# Patient Record
Sex: FEMALE
[De-identification: names, ages, dates, MRNs, and addresses within clinical notes are randomized per-mention and may not be internally consistent; named-entity substitution may affect disease eponyms.]

---

## 2021-12-28 ENCOUNTER — APPOINTMENT (OUTPATIENT)
Dept: AFTER HOURS CARE | Facility: EMERGENCY ROOM | Age: 61
End: 2021-12-28

## 2021-12-28 ENCOUNTER — TRANSCRIPTION ENCOUNTER (OUTPATIENT)
Age: 61
End: 2021-12-28

## 2021-12-29 DIAGNOSIS — R30.0 DYSURIA: ICD-10-CM

## 2021-12-29 PROBLEM — Z00.00 ENCOUNTER FOR PREVENTIVE HEALTH EXAMINATION: Status: ACTIVE | Noted: 2021-12-29

## 2021-12-29 PROCEDURE — 99203 OFFICE O/P NEW LOW 30 MIN: CPT | Mod: 1L,95

## 2021-12-29 RX ORDER — FAMOTIDINE 20 MG/1
20 TABLET, FILM COATED ORAL DAILY
Qty: 14 | Refills: 0 | Status: ACTIVE | COMMUNITY
Start: 2021-12-29 | End: 1900-01-01

## 2021-12-29 RX ORDER — PHENAZOPYRIDINE 100 MG/1
100 TABLET, FILM COATED ORAL 3 TIMES DAILY
Qty: 6 | Refills: 0 | Status: ACTIVE | COMMUNITY
Start: 2021-12-29 | End: 1900-01-01

## 2021-12-29 RX ORDER — CEFPODOXIME PROXETIL 100 MG/1
100 TABLET, FILM COATED ORAL
Qty: 14 | Refills: 0 | Status: ACTIVE | COMMUNITY
Start: 2021-12-29 | End: 1900-01-01

## 2021-12-29 NOTE — HISTORY OF PRESENT ILLNESS
[Other Location: e.g. School (Enter Location, City,State)___] : at [unfilled], at the time of the visit. [Other Location: e.g. Home (Enter Location, City,State)___] : at [unfilled] [Verbal consent obtained from patient] : the patient, [unfilled] [FreeTextEntry8] : 61yoF; with no sigif PMH; now p/w dysuria and increased frequency, urgency x6days.  reports going to PMD and receiving Cipro 6 days ago, but reports symptoms have only worsened.  denies f/c/s. c/o of mild nausea. denies vomiting.  denies flank pain. denies abd pain. trace vaginal discharge, but patient reports she is celibate and no hx of STDs.  denies cp/sob/palp. denies cough.\par PMH:  denies\par ALL: NKDA, Pepper\par

## 2021-12-29 NOTE — PLAN
[With new medications prescribed] : Treat in place: with new medications prescribed [FreeTextEntry1] : 61yoF p/w dysuria and increased frequency, urgency\par -will send pyridium and abx to pharmacy\par -follow up with PMD\par -Seek immediate attention in ER/call 911: if worsening abdominal pain, vomiting, intractable fever, or any other acute causes of concern.

## 2021-12-29 NOTE — REVIEW OF SYSTEMS
[Nausea] : nausea [Dysuria] : dysuria [Frequency] : frequency [Vaginal Discharge] : vaginal discharge [Fever] : no fever [Chills] : no chills [Abdominal Pain] : no abdominal pain [Diarrhea] : diarrhea [Vomiting] : no vomiting

## 2021-12-29 NOTE — PHYSICAL EXAM
[No Acute Distress] : no acute distress [Well-Appearing] : well-appearing [Soft] : abdomen soft [Non Tender] : non-tender [No CVA Tenderness] : no CVA  tenderness [de-identified] : self-exam, no CVAT on self-exam [de-identified] : self-exam

## 2021-12-29 NOTE — ASSESSMENT
[FreeTextEntry1] : 61yoF p/w dysuria and increased frequency, urgency\par -will send pyridium and abx to pharmacy\par -follow up with PMD\par -Seek immediate attention in ER/call 911: if worsening abdominal pain, vomiting, intractable fever, or any other acute causes of concern.